# Patient Record
Sex: FEMALE | Race: WHITE | NOT HISPANIC OR LATINO | Employment: UNEMPLOYED | URBAN - METROPOLITAN AREA
[De-identification: names, ages, dates, MRNs, and addresses within clinical notes are randomized per-mention and may not be internally consistent; named-entity substitution may affect disease eponyms.]

---

## 2018-03-14 ENCOUNTER — TELEPHONE (OUTPATIENT)
Dept: INFUSION THERAPY | Facility: HOSPITAL | Age: 83
End: 2018-03-14

## 2018-03-14 NOTE — TELEPHONE ENCOUNTER
----- Message from Annette Wooten sent at 3/14/2018 10:03 AM CDT -----  Contact: pt- daughter in law Elva Dumont  Christiano pt daughter states her mother in law  blood level are low wants to be seen she was get blood infusions at her care center, she or her  can be reached at  [phone] or [phone]

## 2018-03-14 NOTE — TELEPHONE ENCOUNTER
Left message with protocol on getting scheduled with a hematologist. Recommended labs work, etc be sent and to contact us to schedule consult.

## 2023-02-07 ENCOUNTER — OFFICE VISIT (OUTPATIENT)
Dept: PODIATRY | Facility: CLINIC | Age: 88
End: 2023-02-07
Payer: MEDICARE

## 2023-02-07 VITALS — WEIGHT: 153 LBS | HEIGHT: 64 IN | BODY MASS INDEX: 26.12 KG/M2

## 2023-02-07 DIAGNOSIS — B35.1 ONYCHOMYCOSIS: Primary | ICD-10-CM

## 2023-02-07 DIAGNOSIS — I87.2 VENOUS INSUFFICIENCY OF BOTH LOWER EXTREMITIES: ICD-10-CM

## 2023-02-07 DIAGNOSIS — I73.9 ARTERIAL INSUFFICIENCY OF LOWER EXTREMITY: ICD-10-CM

## 2023-02-07 PROCEDURE — 99204 OFFICE O/P NEW MOD 45 MIN: CPT | Mod: PBBFAC,25 | Performed by: PODIATRIST

## 2023-02-07 PROCEDURE — 11720 DEBRIDE NAIL 1-5: CPT | Mod: Q8,59,S$PBB, | Performed by: PODIATRIST

## 2023-02-07 PROCEDURE — 11719 PR TRIM NAIL(S): ICD-10-PCS | Mod: Q8,S$PBB,, | Performed by: PODIATRIST

## 2023-02-07 PROCEDURE — 99203 PR OFFICE/OUTPT VISIT, NEW, LEVL III, 30-44 MIN: ICD-10-PCS | Mod: 25,S$PBB,, | Performed by: PODIATRIST

## 2023-02-07 PROCEDURE — 11719 TRIM NAIL(S) ANY NUMBER: CPT | Mod: Q8,PBBFAC | Performed by: PODIATRIST

## 2023-02-07 PROCEDURE — 99999 PR PBB SHADOW E&M-NEW PATIENT-LVL IV: CPT | Mod: PBBFAC,,, | Performed by: PODIATRIST

## 2023-02-07 PROCEDURE — 11720 DEBRIDE NAIL 1-5: CPT | Mod: Q8,59,PBBFAC | Performed by: PODIATRIST

## 2023-02-07 PROCEDURE — 11719 TRIM NAIL(S) ANY NUMBER: CPT | Mod: Q8,S$PBB,, | Performed by: PODIATRIST

## 2023-02-07 PROCEDURE — 99999 PR PBB SHADOW E&M-NEW PATIENT-LVL IV: ICD-10-PCS | Mod: PBBFAC,,, | Performed by: PODIATRIST

## 2023-02-07 PROCEDURE — 99203 OFFICE O/P NEW LOW 30 MIN: CPT | Mod: 25,S$PBB,, | Performed by: PODIATRIST

## 2023-02-07 PROCEDURE — 11720 PR DEBRIDEMENT OF NAIL(S), 1-5: ICD-10-PCS | Mod: Q8,59,S$PBB, | Performed by: PODIATRIST

## 2023-02-07 RX ORDER — ERGOCALCIFEROL 1.25 MG/1
50000 CAPSULE ORAL
COMMUNITY
Start: 2023-01-25 | End: 2023-07-24

## 2023-02-07 RX ORDER — CEPHALEXIN 500 MG/1
500 CAPSULE ORAL
COMMUNITY
Start: 2023-01-17 | End: 2023-02-16

## 2023-02-07 RX ORDER — DICLOFENAC SODIUM 10 MG/G
2 GEL TOPICAL 4 TIMES DAILY PRN
COMMUNITY

## 2023-02-07 RX ORDER — LACTULOSE 10 G/15ML
SOLUTION ORAL; RECTAL
COMMUNITY
Start: 2023-01-16

## 2023-02-07 RX ORDER — POLYETHYLENE GLYCOL 3350 17 G/17G
17 POWDER, FOR SOLUTION ORAL
COMMUNITY
Start: 2022-12-02

## 2023-02-07 RX ORDER — CLOPIDOGREL BISULFATE 75 MG/1
TABLET ORAL
COMMUNITY
Start: 2023-02-06

## 2023-02-07 RX ORDER — CRANBERRY FRUIT 450 MG
1 TABLET ORAL DAILY
COMMUNITY

## 2023-02-07 RX ORDER — POTASSIUM CHLORIDE 750 MG/1
10 TABLET, EXTENDED RELEASE ORAL
COMMUNITY
Start: 2022-09-17

## 2023-02-07 RX ORDER — FERROUS SULFATE 325(65) MG
325 TABLET, DELAYED RELEASE (ENTERIC COATED) ORAL
COMMUNITY

## 2023-02-07 RX ORDER — LOSARTAN POTASSIUM 50 MG/1
50 TABLET ORAL
COMMUNITY
Start: 2023-02-05

## 2023-02-07 RX ORDER — ACETAMINOPHEN 325 MG/1
650 TABLET ORAL EVERY 4 HOURS PRN
COMMUNITY

## 2023-02-07 RX ORDER — IPRATROPIUM BROMIDE AND ALBUTEROL SULFATE 2.5; .5 MG/3ML; MG/3ML
3 SOLUTION RESPIRATORY (INHALATION)
COMMUNITY

## 2023-02-07 RX ORDER — CYANOCOBALAMIN 1000 UG/ML
INJECTION, SOLUTION INTRAMUSCULAR; SUBCUTANEOUS
COMMUNITY
Start: 2023-02-03

## 2023-02-07 RX ORDER — ALENDRONATE SODIUM 70 MG/1
70 TABLET ORAL
COMMUNITY

## 2023-02-07 RX ORDER — MONTELUKAST SODIUM 10 MG/1
1 TABLET ORAL NIGHTLY
COMMUNITY

## 2023-02-07 RX ORDER — SENNOSIDES 8.6 MG/1
2 TABLET ORAL
COMMUNITY

## 2023-02-07 RX ORDER — FLUTICASONE PROPIONATE 50 MCG
2 SPRAY, SUSPENSION (ML) NASAL
COMMUNITY

## 2023-02-07 RX ORDER — OXYBUTYNIN CHLORIDE 15 MG/1
15 TABLET, EXTENDED RELEASE ORAL
COMMUNITY
Start: 2023-02-05

## 2023-02-07 RX ORDER — MIRABEGRON 50 MG/1
1 TABLET, FILM COATED, EXTENDED RELEASE ORAL
COMMUNITY
Start: 2023-02-05

## 2023-02-07 RX ORDER — NITROFURANTOIN 25; 75 MG/1; MG/1
100 CAPSULE ORAL
COMMUNITY
Start: 2023-01-20

## 2023-02-07 RX ORDER — PANTOPRAZOLE SODIUM 40 MG/1
40 TABLET, DELAYED RELEASE ORAL
COMMUNITY
Start: 2023-01-25

## 2023-02-07 RX ORDER — ONDANSETRON 4 MG/1
TABLET, FILM COATED ORAL
COMMUNITY
Start: 2023-01-16

## 2023-02-07 RX ORDER — MEGESTROL ACETATE 40 MG/ML
SUSPENSION ORAL
COMMUNITY
Start: 2022-12-30

## 2023-02-07 RX ORDER — SERTRALINE HYDROCHLORIDE 50 MG/1
50 TABLET, FILM COATED ORAL
COMMUNITY
Start: 2023-02-02

## 2023-02-07 RX ORDER — ALBUTEROL SULFATE 90 UG/1
2 AEROSOL, METERED RESPIRATORY (INHALATION) EVERY 6 HOURS PRN
COMMUNITY
Start: 2022-07-11

## 2023-02-07 RX ORDER — MEMANTINE HYDROCHLORIDE 10 MG/1
10 TABLET ORAL 2 TIMES DAILY
COMMUNITY
Start: 2023-01-25

## 2023-02-07 RX ORDER — AZELASTINE 1 MG/ML
SPRAY, METERED NASAL
COMMUNITY
Start: 2023-01-16

## 2023-02-07 RX ORDER — CHOLECALCIFEROL (VITAMIN D3) 25 MCG
4000 TABLET ORAL
COMMUNITY

## 2023-02-07 NOTE — PROGRESS NOTES
Subjective:       Patient ID: Slime Dumont is a 89 y.o. female.    Chief Complaint: Nail Care (C/o long toenails, pts relative states that she trimmed them down recently as well, 0 pain, non-diabetic, wears casual shoes, last seen PCP Dr. Salazar 01/17/2023)      HPI: Slime Dumont presents to the office with the chief complaint of elongated, thickened and dystrophic nail plates to the B/L foot. This patient does have Peripheral Angiopathy. Patient does follow with Primary Care for management of comorbid states. This patient's PMD is Yadira Salazar MD. This patient last saw his/her primary care provider on 1/17/23. Daughter in law, Elva is present today. Patient is from [facility] [place].      Review of patient's allergies indicates:   Allergen Reactions    Nickel sutures [surgical stainless steel] Rash       Past Medical History:   Diagnosis Date    Anemia     Anxiety     Arthritis     Coronary artery disease     Encounter for blood transfusion     GERD (gastroesophageal reflux disease)     Hypertension     Hyperthyroidism     Osteoporosis        History reviewed. No pertinent family history.    Social History     Socioeconomic History    Marital status:    Tobacco Use    Smoking status: Former     Types: Cigarettes    Smokeless tobacco: Never       Past Surgical History:   Procedure Laterality Date    APPENDECTOMY      BACK SURGERY      CAROTID STENT      CHOLECYSTECTOMY      EYE SURGERY         Review of Systems   Constitutional:  Negative for chills, fatigue and fever.   HENT:  Negative for hearing loss.    Eyes:  Negative for photophobia and visual disturbance.   Respiratory:  Negative for cough, chest tightness, shortness of breath and wheezing.    Cardiovascular:  Positive for leg swelling. Negative for chest pain and palpitations.   Gastrointestinal:  Negative for constipation, diarrhea, nausea and vomiting.   Endocrine: Negative for cold intolerance and heat intolerance.  "  Genitourinary:  Negative for flank pain.   Musculoskeletal:  Positive for arthralgias, joint swelling and myalgias. Negative for neck pain and neck stiffness.   Neurological:  Negative for light-headedness and headaches.   Psychiatric/Behavioral:  Negative for sleep disturbance.         Objective:   Ht 5' 4" (1.626 m)   Wt 69.4 kg (153 lb)   BMI 26.26 kg/m²     Physical Exam  LOWER EXTREMITY PHYSICAL EXAMINATION  VASCULAR: Capillary refill time is sluggish. Spider veins are noted to the bilateral lower extremity. Edema is noted, mild, pitting. Proximal to distal temperature is WNL. Palpation of pulses to the lower extremity is diminished on the left and right. The left dorsalis pedis pulse is 0/4 and on the right is 0/4. The left posterior tibial pulse is 0/4 on the right is 0/4. Hair growth is diminished to absent on the bilateral dorsal foot and at the digits.     DERMATOLOGY: There are nail changes which are consistent with onychomycosis on the left and right foot. On the left, nails 1 and 2 are thickened, are dystrophic, with yellow discoloration, and with malodorous subungual debris. On the right, nails 1 and 4 are thickened, are dystrophic, with yellow discoloration, and with malodorous subungual debris. These thickened and dystrophic nails are painful to touch and palpation. The remaining nail plates are elongated, and are not suggestive of onychomycosis. Hyperpigmentation is noted.       Assessment:     1. Onychomycosis    2. Venous insufficiency of both lower extremities    3. Arterial insufficiency of lower extremity        Plan:     Onychomycosis  Onychomycotic nail plates, as outlined above, are sharply debrided with double action nail nipper, and/or with the assistance of a mechanical rotary homar for reduction of pains. Nails are reduced in terms of length, width and girth with removal of subungual debris to facilitate pain free weight bearing and ambulation. Elongated nails as outlined in the " objective portion of this note, were trimmed to appropriate length for alleviation/reduction of pains as well. Follow up in approx. 4-6 months.    Venous insufficiency of both lower extremities    Arterial insufficiency of lower extremity              No future appointments.